# Patient Record
(demographics unavailable — no encounter records)

---

## 2025-01-03 NOTE — DISCUSSION/SUMMARY
[FreeTextEntry1] : he has a tendency to see [facility] doctors and has seen two ID doctor, and is about to see a third pulmonologist his present opinions have been way off at the present time there is no indication that he has a reason to be treated for ntm was told he had m abscessus as well, first thing is serial collection of sputum  then i need to review the ct he asked a fantatic amount of questions, finally at the one hour juntion I told him I could not answer any more about every aspect of ntm he could think of he has the vest, and flutter, but should also use hypertonic saline should not be on inhaled steroids with nebulizer for either bronchiectasis or for asthma

## 2025-01-03 NOTE — DISCUSSION/SUMMARY
[FreeTextEntry1] : he has a tendency to see [facility] doctors and has seen two ID doctor, and is about to see a third pulmonologist his present opinions have been way off at the present time there is no indication that he has a reason to be treated for ntm was told he had m abscessus as well, first thing is serial collection of sputum  then i need to review the ct he asked a fantatic amount of questions, finally at the one hour juntion I told him I could not answer any more about every aspect of ntm he could think of he has the vest, and flutter, but should also use hypertonic saline should not be on inhaled steroids with nebulizer for either bronchiectasis or for asthma

## 2025-01-03 NOTE — HISTORY OF PRESENT ILLNESS
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 not on Wing despite being on third course of treatment for bronchiectasis

## 2025-01-03 NOTE — PROCEDURE
[FreeTextEntry1] : his chest film is unimpressive with some slight obscuration of the left heart border only

## 2025-01-03 NOTE — DISCUSSION/SUMMARY
[FreeTextEntry1] : he has a tendency to see [facility] and has seen two ID doctor, and is about to see a third pulmonologist his present opinions have been way off at the present time there is no indication that he has a reason to be treated for ntm was told he had m abscessus as well, first thing is serial collection of sputum  then i need to review the ct he asked a fantatic amount of questions, finally at the one hour juntion I told him I could not answer any more about every aspect of ntm he could think of he has the vest, and flutter, but should also use hypertonic saline should not be on inhaled steroids with nebulizer for either bronchiectasis or for asthma

## 2025-01-03 NOTE — REASON FOR VISIT
[Follow-Up] : a follow-up visit [TextBox_44] : patien with asthma, bronchiectasis and ntm ( one ? smear postelizabeti darrel mitchell)

## 2025-01-18 NOTE — HISTORY OF PRESENT ILLNESS
[TextBox_4] : patient here to review issues 1) bronchiectasis 2) asthma? 3) pseudomonas infection 4) stenotorphomonas bactrim given really hasn't helped ct scan done in dec with dense infiiltrate one afb positive for absessus been treated multipl time for pseudmonnas we've covered pulmonary hygeine compoletel aerobika with hypertonic saline

## 2025-01-18 NOTE — DISCUSSION/SUMMARY
[FreeTextEntry1] : we discussed for a half hour i do not think mycobacterial disease predominates here the opacity is likely pseudomonas will see if we can try an elimination type approach with bronch and then IV antibiotics for three weeks if psedo will get generous sputum sample for afb as well.  will arrange many quesiton asked, and answer

## 2025-03-31 NOTE — PHYSICAL EXAM
[No Acute Distress] : no acute distress [Normal Oropharynx] : normal oropharynx [Normal Appearance] : normal appearance [Normal Rate/Rhythm] : normal rate/rhythm [Normal S1, S2] : normal s1, s2 [No Clubbing] : no clubbing [No Edema] : no edema [TextBox_68] : rhonchi and rales

## 2025-03-31 NOTE — ASSESSMENT
[FreeTextEntry1] : had ct scan done today around the corner rll opacity is improved he should be Religion about his airway clearance may benefit from brensocatib this summer when it becomes available 
Negative for evidence of intravascular injection or CSF

## 2025-03-31 NOTE — HISTORY OF PRESENT ILLNESS
[TextBox_4] : being followed for bronchiectasis and cough had pseudomonas in sputum which was treated had stenotrophomonas in the sputum which was treated underwent bronchoschopy a lot of junk in the airways but no cultures grew out needs repeat ct scan as had consolidative opacity in rll feeling out of breath with pickleball, likes to sing